# Patient Record
Sex: MALE | Race: OTHER | HISPANIC OR LATINO | Employment: UNEMPLOYED | URBAN - METROPOLITAN AREA
[De-identification: names, ages, dates, MRNs, and addresses within clinical notes are randomized per-mention and may not be internally consistent; named-entity substitution may affect disease eponyms.]

---

## 2023-12-26 ENCOUNTER — HOSPITAL ENCOUNTER (EMERGENCY)
Facility: HOSPITAL | Age: 38
Discharge: HOME | End: 2023-12-27
Attending: EMERGENCY MEDICINE

## 2023-12-26 DIAGNOSIS — M54.2 NECK PAIN: ICD-10-CM

## 2023-12-26 DIAGNOSIS — J02.9 SORE THROAT: Primary | ICD-10-CM

## 2023-12-26 LAB
ALBUMIN SERPL BCP-MCNC: 4.3 G/DL (ref 3.5–5.2)
ALP SERPL-CCNC: 79 U/L (ref 55–135)
ALT SERPL W/O P-5'-P-CCNC: 77 U/L (ref 10–44)
ANION GAP SERPL CALC-SCNC: 12 MMOL/L (ref 8–16)
AST SERPL-CCNC: 44 U/L (ref 10–40)
BASOPHILS # BLD AUTO: 0.06 K/UL (ref 0–0.2)
BASOPHILS NFR BLD: 0.6 % (ref 0–1.9)
BILIRUB SERPL-MCNC: 0.7 MG/DL (ref 0.1–1)
BUN SERPL-MCNC: 19 MG/DL (ref 6–20)
CALCIUM SERPL-MCNC: 9.6 MG/DL (ref 8.7–10.5)
CHLORIDE SERPL-SCNC: 104 MMOL/L (ref 95–110)
CO2 SERPL-SCNC: 22 MMOL/L (ref 23–29)
CREAT SERPL-MCNC: 1 MG/DL (ref 0.5–1.4)
DIFFERENTIAL METHOD: ABNORMAL
EOSINOPHIL # BLD AUTO: 0.2 K/UL (ref 0–0.5)
EOSINOPHIL NFR BLD: 2 % (ref 0–8)
ERYTHROCYTE [DISTWIDTH] IN BLOOD BY AUTOMATED COUNT: 12.6 % (ref 11.5–14.5)
EST. GFR  (NO RACE VARIABLE): >60 ML/MIN/1.73 M^2
GLUCOSE SERPL-MCNC: 109 MG/DL (ref 70–110)
GROUP A STREP, MOLECULAR: NEGATIVE
HCT VFR BLD AUTO: 39.3 % (ref 40–54)
HGB BLD-MCNC: 13.8 G/DL (ref 14–18)
IMM GRANULOCYTES # BLD AUTO: 0.05 K/UL (ref 0–0.04)
IMM GRANULOCYTES NFR BLD AUTO: 0.5 % (ref 0–0.5)
LYMPHOCYTES # BLD AUTO: 2.8 K/UL (ref 1–4.8)
LYMPHOCYTES NFR BLD: 28.8 % (ref 18–48)
MCH RBC QN AUTO: 28.9 PG (ref 27–31)
MCHC RBC AUTO-ENTMCNC: 35.1 G/DL (ref 32–36)
MCV RBC AUTO: 82 FL (ref 82–98)
MONOCYTES # BLD AUTO: 0.7 K/UL (ref 0.3–1)
MONOCYTES NFR BLD: 7.5 % (ref 4–15)
NEUTROPHILS # BLD AUTO: 5.8 K/UL (ref 1.8–7.7)
NEUTROPHILS NFR BLD: 60.6 % (ref 38–73)
NRBC BLD-RTO: 0 /100 WBC
PLATELET # BLD AUTO: 253 K/UL (ref 150–450)
PMV BLD AUTO: 9.6 FL (ref 9.2–12.9)
POTASSIUM SERPL-SCNC: 4.1 MMOL/L (ref 3.5–5.1)
PROT SERPL-MCNC: 7.7 G/DL (ref 6–8.4)
RBC # BLD AUTO: 4.78 M/UL (ref 4.6–6.2)
SODIUM SERPL-SCNC: 138 MMOL/L (ref 136–145)
WBC # BLD AUTO: 9.62 K/UL (ref 3.9–12.7)

## 2023-12-26 PROCEDURE — 63600175 PHARM REV CODE 636 W HCPCS: Performed by: EMERGENCY MEDICINE

## 2023-12-26 PROCEDURE — 85025 COMPLETE CBC W/AUTO DIFF WBC: CPT | Performed by: EMERGENCY MEDICINE

## 2023-12-26 PROCEDURE — 99284 EMERGENCY DEPT VISIT MOD MDM: CPT | Mod: 25

## 2023-12-26 PROCEDURE — 87651 STREP A DNA AMP PROBE: CPT | Performed by: EMERGENCY MEDICINE

## 2023-12-26 PROCEDURE — 80053 COMPREHEN METABOLIC PANEL: CPT | Performed by: EMERGENCY MEDICINE

## 2023-12-26 PROCEDURE — 96374 THER/PROPH/DIAG INJ IV PUSH: CPT

## 2023-12-26 RX ORDER — KETOROLAC TROMETHAMINE 30 MG/ML
15 INJECTION, SOLUTION INTRAMUSCULAR; INTRAVENOUS
Status: COMPLETED | OUTPATIENT
Start: 2023-12-26 | End: 2023-12-26

## 2023-12-26 RX ORDER — KETOROLAC TROMETHAMINE 30 MG/ML
15 INJECTION, SOLUTION INTRAMUSCULAR; INTRAVENOUS
Status: DISCONTINUED | OUTPATIENT
Start: 2023-12-26 | End: 2023-12-27

## 2023-12-26 RX ADMIN — KETOROLAC TROMETHAMINE 15 MG: 30 INJECTION, SOLUTION INTRAMUSCULAR at 11:12

## 2023-12-26 NOTE — Clinical Note
"Barry Myers" Jose Angel was seen and treated in our emergency department on 12/26/2023.  He may return to work on 12/29/2023.       If you have any questions or concerns, please don't hesitate to call.      Juancho Purvis MD"

## 2023-12-27 VITALS
BODY MASS INDEX: 32.58 KG/M2 | OXYGEN SATURATION: 98 % | HEIGHT: 69 IN | HEART RATE: 91 BPM | RESPIRATION RATE: 18 BRPM | WEIGHT: 220 LBS | SYSTOLIC BLOOD PRESSURE: 172 MMHG | DIASTOLIC BLOOD PRESSURE: 98 MMHG | TEMPERATURE: 98 F

## 2023-12-27 PROCEDURE — 96375 TX/PRO/DX INJ NEW DRUG ADDON: CPT

## 2023-12-27 PROCEDURE — 63600175 PHARM REV CODE 636 W HCPCS: Performed by: EMERGENCY MEDICINE

## 2023-12-27 RX ORDER — DEXAMETHASONE SODIUM PHOSPHATE 4 MG/ML
16 INJECTION, SOLUTION INTRA-ARTICULAR; INTRALESIONAL; INTRAMUSCULAR; INTRAVENOUS; SOFT TISSUE
Status: COMPLETED | OUTPATIENT
Start: 2023-12-27 | End: 2023-12-27

## 2023-12-27 RX ADMIN — DEXAMETHASONE SODIUM PHOSPHATE 16 MG: 4 INJECTION, SOLUTION INTRA-ARTICULAR; INTRALESIONAL; INTRAMUSCULAR; INTRAVENOUS; SOFT TISSUE at 12:12

## 2023-12-27 NOTE — ED NOTES
Patient received for discharge.  Patient is awake, alert, and oriented x 4.  Speech clear and appropriate.  RR regular and non labored.  Skin W/D/P.  Given written and verbal discharge instruction, with verbal understanding.  Patient given the opportunity to ask questions, with answers to meet needs.  No complaints or noted concerns.

## 2023-12-27 NOTE — DISCHARGE INSTRUCTIONS
Please continue to monitor observe your symptoms.  Tylenol Motrin for pain discomfort.  Return to the ER to obtain a CT scan if you feel that your symptoms are worsening.

## 2023-12-27 NOTE — ED PROVIDER NOTES
Encounter Date: 12/26/2023       History     Chief Complaint   Patient presents with    Neck Pain    Otalgia     Patient states that he was treated for a right side ear infection on the 8th and finished the course of antibiotics. He states that pain is still there and now he has severe pain to his right front neck and to the right face. He denies fever or other acute symptoms.      HPI    Pleasant 38-year-old male presents the ER for evaluation of persistent right-sided neck pain.  Patient reports on December 8th he was treated for otitis media with antibiotics.  He reports that a few days ago he was sitting at [employer] when he felt a pop followed by severe pain on the right lower side of his neck.  He reports he became concerned due the persistent symptoms and came the ER for further evaluation.    Review of patient's allergies indicates:  No Known Allergies  No past medical history on file.  No past surgical history on file.  No family history on file.     Review of Systems   HENT:  Positive for sore throat.    Cardiovascular:  Negative for chest pain.   Musculoskeletal:  Positive for neck pain.   All other systems reviewed and are negative.      Physical Exam     Initial Vitals [12/26/23 2130]   BP Pulse Resp Temp SpO2   (!) 179/106 97 18 97.8 °F (36.6 °C) 99 %      MAP       --         Physical Exam    Nursing note and vitals reviewed.  Constitutional: He appears well-developed and well-nourished.   HENT:   Head: Normocephalic and atraumatic.   Eyes: Pupils are equal, round, and reactive to light.   Neck:   Reproducible right lower anterior neck tenderness no crepitus no step-offs, no deformities, posterior oropharynx grossly normal     Right ear tympanic membrane slight erythema but no significant exudate no sign of superimposed infection   Normal range of motion.  Cardiovascular:  Normal rate, regular rhythm and normal heart sounds.           Pulmonary/Chest: Breath sounds normal.   Abdominal: Abdomen is soft.  He exhibits no distension. There is no abdominal tenderness.   Musculoskeletal:         General: Normal range of motion.      Cervical back: Normal range of motion.     Neurological: He is alert and oriented to person, place, and time. He has normal strength. GCS score is 15. GCS eye subscore is 4. GCS verbal subscore is 5. GCS motor subscore is 6.   Skin: Skin is warm and dry. Capillary refill takes less than 2 seconds.   Psychiatric: He has a normal mood and affect. Thought content normal.         ED Course   Procedures  Labs Reviewed   CBC W/ AUTO DIFFERENTIAL - Abnormal; Notable for the following components:       Result Value    Hemoglobin 13.8 (*)     Hematocrit 39.3 (*)     Immature Grans (Abs) 0.05 (*)     All other components within normal limits   COMPREHENSIVE METABOLIC PANEL - Abnormal; Notable for the following components:    CO2 22 (*)     AST 44 (*)     ALT 77 (*)     All other components within normal limits   GROUP A STREP, MOLECULAR          Imaging Results              X-Ray Neck Soft Tissue (Final result)  Result time 12/26/23 23:19:40      Final result by Jackson Erickson DO (12/26/23 23:19:40)                   Impression:      Unremarkable radiograph of the neck soft tissues.      Electronically signed by: Jackson Erickson  Date:    12/26/2023  Time:    23:19               Narrative:    EXAMINATION:  XR NECK SOFT TISSUE    CLINICAL HISTORY:  Cervicalgia    TECHNIQUE:  AP and lateral soft tissue views the neck were performed.    COMPARISON:  None.    FINDINGS:  The soft tissues of the neck are unremarkable.  The epiglottis is normal.  There is no radiopaque foreign body.  The airways patent.  Osseous structures are intact.                                       Medications   ketorolac injection 15 mg (15 mg Intravenous Given 12/26/23 2301)   dexAMETHasone injection 16 mg (16 mg Intravenous Given 12/27/23 0034)     Medical Decision Making  38-year-old male presents the ER for evaluation of ear  pain and otalgia.  Patient was seen the 8th for similar complaints diagnosed with otitis media finished a course antibiotics.  He reports maybe 6 7 days ago he was singing then he felt a pop crack sensation on his throat and now having significant pain no improvement with medications came the ER for further evaluation.    Amount and/or Complexity of Data Reviewed  Labs: ordered. Decision-making details documented in ED Course.  Radiology: ordered. Decision-making details documented in ED Course.    Risk  Prescription drug management.               ED Course as of 12/27/23 0311   Wed Dec 27, 2023   0014 X-Ray Neck Soft Tissue [SE]   0014 Group A Strep, Molecular [SE]   0014 CBC auto differential(!) [SE]   0015 Resting in bed no acute distress patient does endorse improvement in symptoms but pain is still there.  Labs and imaging obtained grossly reassuring.  I discussed with patient , I offered a CT scan but we agreed it is low yield.  Patient will be given 1 time dose of Decadron and will be discharged.  Bedside ultrasound of the internal jugular and carotid artery bilaterally was grossly reassuring no dissection.  Patient will continue to monitor observe symptoms.  If they get worse or any concerning reason he will return to the ER for a CT of the neck.  Patient understood agreed with plan. [SE]      ED Course User Index  [SE] Juancho Purvis MD                           Clinical Impression:  Final diagnoses:  [M54.2] Neck pain  [M54.2] Neck pain - x10 days  [J02.9] Sore throat (Primary)          ED Disposition Condition    Discharge Stable          ED Prescriptions    None       Follow-up Information    None          Juancho Purvis MD  12/27/23 0311

## 2023-12-27 NOTE — ED NOTES
Pt c/o rt sided throat pain, increased pain when swallowing s/p completed abx tx for rt side ear infection. GCS15, AAOx4